# Patient Record
Sex: FEMALE | Race: OTHER | NOT HISPANIC OR LATINO | URBAN - METROPOLITAN AREA
[De-identification: names, ages, dates, MRNs, and addresses within clinical notes are randomized per-mention and may not be internally consistent; named-entity substitution may affect disease eponyms.]

---

## 2019-03-12 ENCOUNTER — OUTPATIENT (OUTPATIENT)
Dept: OUTPATIENT SERVICES | Facility: HOSPITAL | Age: 49
LOS: 1 days | End: 2019-03-12

## 2019-03-12 ENCOUNTER — APPOINTMENT (OUTPATIENT)
Dept: PLASTIC SURGERY | Facility: CLINIC | Age: 49
End: 2019-03-12

## 2019-03-12 VITALS
DIASTOLIC BLOOD PRESSURE: 63 MMHG | SYSTOLIC BLOOD PRESSURE: 93 MMHG | HEIGHT: 60 IN | RESPIRATION RATE: 14 BRPM | TEMPERATURE: 97.5 F | HEART RATE: 82 BPM

## 2019-03-12 VITALS — WEIGHT: 132 LBS | BODY MASS INDEX: 25.78 KG/M2

## 2019-03-12 DIAGNOSIS — Z78.9 OTHER SPECIFIED HEALTH STATUS: ICD-10-CM

## 2019-03-12 PROBLEM — Z00.00 ENCOUNTER FOR PREVENTIVE HEALTH EXAMINATION: Status: ACTIVE | Noted: 2019-03-12

## 2019-03-12 RX ORDER — CALCIUM CARBONATE/VITAMIN D3 600 MG-10
TABLET ORAL
Refills: 0 | Status: ACTIVE | COMMUNITY

## 2019-03-12 NOTE — HISTORY OF PRESENT ILLNESS
[FreeTextEntry1] : 48 yo F  presents for mastopexy/reduction consultation.\par \par Pt c/o b/l heavy breasts, L breast larger than R and shoulder grooving.\par Currently wears 36C/D bra. Denies any breast masses or nipple discharge.  She would like a more lifted look.\par \par Last mammogram in Dec 2018, benign findings. Had L breast biopsy 3 years ago, benign.\par \par She is also unhappy with her abdominal appearance and is interested in Abdominoplasty. Denies any hx of hernias or bulges. Weight has been stable.  Has 4 children. No plans for future pregnancies.\par \par Denies fam or personal hx of breast CA, ovarian, CA, autorimmune/clotting D/O, DVT/PT, unfavorable scarring.\par \par PMHx: none\par PSHx: none\par NKDA\par Meds: omega, MVT\par Soc Hx: neg\par Fam Hx: neg

## 2019-03-12 NOTE — ASSESSMENT
[FreeTextEntry1] : 50 yo F  with significant breast asymmetry presents for mastopexy/reduction and abdominoplasty consultation\par \par - Pt is a good candidate for vertical mastopexy with T extension (more parenchyma resection on L breast for symmetry) and traditional abdominoplasty\par - Risks, benefits and alternatives of both procedures discussed with patient in detail.  Possible complication include but are not limited to bleeding, infection, wound healing problems, asymmetry, seroma, fat necrosis, contour irregularities, changes in NAC sensation, loss of NAC, risk of DVT/PE, need for future revisions surgery\par \par \par

## 2019-03-13 DIAGNOSIS — Z01.89 ENCOUNTER FOR OTHER SPECIFIED SPECIAL EXAMINATIONS: ICD-10-CM
